# Patient Record
Sex: MALE | Race: WHITE | NOT HISPANIC OR LATINO | URBAN - METROPOLITAN AREA
[De-identification: names, ages, dates, MRNs, and addresses within clinical notes are randomized per-mention and may not be internally consistent; named-entity substitution may affect disease eponyms.]

---
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
## 2024-10-22 ENCOUNTER — APPOINTMENT (OUTPATIENT)
Dept: OTOLARYNGOLOGY | Facility: CLINIC | Age: 19
End: 2024-10-22
Payer: OTHER GOVERNMENT

## 2024-10-22 VITALS
SYSTOLIC BLOOD PRESSURE: 118 MMHG | WEIGHT: 210 LBS | HEIGHT: 73 IN | HEART RATE: 90 BPM | DIASTOLIC BLOOD PRESSURE: 80 MMHG | BODY MASS INDEX: 27.83 KG/M2

## 2024-10-22 DIAGNOSIS — E04.1 NONTOXIC SINGLE THYROID NODULE: ICD-10-CM

## 2024-10-22 PROCEDURE — 99204 OFFICE O/P NEW MOD 45 MIN: CPT

## 2024-11-07 ENCOUNTER — NON-APPOINTMENT (OUTPATIENT)
Age: 19
End: 2024-11-07

## 2024-11-07 ENCOUNTER — LABORATORY RESULT (OUTPATIENT)
Age: 19
End: 2024-11-07

## 2024-11-07 ENCOUNTER — APPOINTMENT (OUTPATIENT)
Dept: OTOLARYNGOLOGY | Facility: CLINIC | Age: 19
End: 2024-11-07
Payer: OTHER GOVERNMENT

## 2024-11-07 VITALS
OXYGEN SATURATION: 98 % | SYSTOLIC BLOOD PRESSURE: 112 MMHG | WEIGHT: 240 LBS | DIASTOLIC BLOOD PRESSURE: 74 MMHG | BODY MASS INDEX: 30.8 KG/M2 | HEART RATE: 107 BPM | HEIGHT: 74 IN

## 2024-11-07 DIAGNOSIS — E04.1 NONTOXIC SINGLE THYROID NODULE: ICD-10-CM

## 2024-11-07 PROCEDURE — 10005 FNA BX W/US GDN 1ST LES: CPT

## 2024-11-07 PROCEDURE — 99212 OFFICE O/P EST SF 10 MIN: CPT | Mod: 25

## 2024-11-08 ENCOUNTER — TRANSCRIPTION ENCOUNTER (OUTPATIENT)
Age: 19
End: 2024-11-08

## 2024-12-10 ENCOUNTER — APPOINTMENT (OUTPATIENT)
Dept: OTOLARYNGOLOGY | Facility: CLINIC | Age: 19
End: 2024-12-10
Payer: OTHER GOVERNMENT

## 2024-12-10 VITALS
HEIGHT: 74 IN | BODY MASS INDEX: 30.8 KG/M2 | HEART RATE: 74 BPM | SYSTOLIC BLOOD PRESSURE: 122 MMHG | DIASTOLIC BLOOD PRESSURE: 72 MMHG | WEIGHT: 240 LBS

## 2024-12-10 DIAGNOSIS — E04.1 NONTOXIC SINGLE THYROID NODULE: ICD-10-CM

## 2024-12-10 PROCEDURE — 31575 DIAGNOSTIC LARYNGOSCOPY: CPT

## 2024-12-10 PROCEDURE — 99214 OFFICE O/P EST MOD 30 MIN: CPT | Mod: 25

## 2025-01-15 ENCOUNTER — OUTPATIENT (OUTPATIENT)
Dept: OUTPATIENT SERVICES | Facility: HOSPITAL | Age: 20
LOS: 1 days | End: 2025-01-15

## 2025-01-15 VITALS
SYSTOLIC BLOOD PRESSURE: 116 MMHG | WEIGHT: 250 LBS | TEMPERATURE: 97 F | OXYGEN SATURATION: 98 % | RESPIRATION RATE: 18 BRPM | HEIGHT: 74 IN | HEART RATE: 81 BPM | DIASTOLIC BLOOD PRESSURE: 78 MMHG

## 2025-01-15 DIAGNOSIS — E04.1 NONTOXIC SINGLE THYROID NODULE: ICD-10-CM

## 2025-01-15 DIAGNOSIS — Z98.890 OTHER SPECIFIED POSTPROCEDURAL STATES: Chronic | ICD-10-CM

## 2025-01-15 DIAGNOSIS — K08.409 PARTIAL LOSS OF TEETH, UNSPECIFIED CAUSE, UNSPECIFIED CLASS: Chronic | ICD-10-CM

## 2025-01-15 RX ORDER — BUPROPION HYDROCHLORIDE 150 MG/1
0 TABLET, EXTENDED RELEASE ORAL
Refills: 0 | DISCHARGE

## 2025-01-15 RX ORDER — SODIUM CHLORIDE 9 G/ML
1000 INJECTION, SOLUTION INTRAVENOUS
Refills: 0 | Status: DISCONTINUED | OUTPATIENT
Start: 2025-01-22 | End: 2025-01-23

## 2025-01-15 NOTE — H&P PST ADULT - HISTORY OF PRESENT ILLNESS
19 yr old male with ADHD present for preop evaluation with dx of Nontoxic Single Thyroid Nodule noted incidentally on ct scan.  Patient c/o for chest pains in September 2024 and workup for chest pains (negative [facility]) nodule was noted.  Patient was then sent for sonogram and then biopsy. US thyroid IMPRESSION: 2.6cm x 2.0 x 2.2cm Left lower pole nodule (TR3).  11/7/2024 s/p FNA of L thyroid showed AUS (Category III). Patient is now scheduled for Total Thyroidectomy on 01/22/25. 19 yr old male with ADHD present for preop evaluation with dx of Nontoxic Single Thyroid Nodule noted incidentally on ct scan.  Patient c/o for chest pains in September 2024 and workup for chest pains (negative [facility]) nodule was noted.  Patient was then sent for sonogram and then biopsy. US thyroid IMPRESSION: 2.6cm x 2.0 x 2.2cm Left lower pole nodule (TR3).  11/7/2024 s/p FNA of L thyroid showed AUS (Category III). Patient is now scheduled for Left Thyroid Lobectomy on 01/22/25.

## 2025-01-15 NOTE — H&P PST ADULT - NSICDXPASTSURGICALHX_GEN_ALL_CORE_FT
PAST SURGICAL HISTORY:  H/O wisdom tooth extraction      PAST SURGICAL HISTORY:  H/O wisdom tooth extraction     History of endoscopy

## 2025-01-15 NOTE — H&P PST ADULT - NSICDXPASTMEDICALHX_GEN_ALL_CORE_FT
PAST MEDICAL HISTORY:  ADHD     Nontoxic single thyroid nodule      PAST MEDICAL HISTORY:  ADHD     GERD (gastroesophageal reflux disease)     Nontoxic single thyroid nodule

## 2025-01-15 NOTE — H&P PST ADULT - PROBLEM SELECTOR PLAN 1
Scheduled for Left Thyroid Lobectomy on 01/22/25.  Preop instructions provided and patient verbalizes understanding.   Famotidine provided with instructions. Hibiclens provided with instructions and was signed by patient. Teach-back method was utilized to assess patient's understanding. Patient verbalized understanding.

## 2025-01-21 NOTE — ASU PATIENT PROFILE, ADULT - NSICDXPASTMEDICALHX_GEN_ALL_CORE_FT
PAST MEDICAL HISTORY:  ADHD     GERD (gastroesophageal reflux disease)     Nontoxic single thyroid nodule

## 2025-01-22 ENCOUNTER — TRANSCRIPTION ENCOUNTER (OUTPATIENT)
Age: 20
End: 2025-01-22

## 2025-01-22 ENCOUNTER — INPATIENT (INPATIENT)
Facility: HOSPITAL | Age: 20
LOS: 0 days | Discharge: HOME | End: 2025-01-23
Attending: OTOLARYNGOLOGY | Admitting: OTOLARYNGOLOGY
Payer: OTHER GOVERNMENT

## 2025-01-22 ENCOUNTER — APPOINTMENT (OUTPATIENT)
Dept: OTOLARYNGOLOGY | Facility: HOSPITAL | Age: 20
End: 2025-01-22

## 2025-01-22 VITALS
HEIGHT: 74 IN | TEMPERATURE: 98 F | DIASTOLIC BLOOD PRESSURE: 75 MMHG | HEART RATE: 71 BPM | SYSTOLIC BLOOD PRESSURE: 141 MMHG | WEIGHT: 250 LBS | RESPIRATION RATE: 16 BRPM | OXYGEN SATURATION: 99 %

## 2025-01-22 DIAGNOSIS — E04.1 NONTOXIC SINGLE THYROID NODULE: ICD-10-CM

## 2025-01-22 DIAGNOSIS — K08.409 PARTIAL LOSS OF TEETH, UNSPECIFIED CAUSE, UNSPECIFIED CLASS: Chronic | ICD-10-CM

## 2025-01-22 DIAGNOSIS — Z98.890 OTHER SPECIFIED POSTPROCEDURAL STATES: Chronic | ICD-10-CM

## 2025-01-22 LAB
CALCIUM SERPL-MCNC: 9.4 MG/DL — SIGNIFICANT CHANGE UP (ref 8.4–10.5)
PTH-INTACT FLD-MCNC: 23 PG/ML — SIGNIFICANT CHANGE UP (ref 15–65)

## 2025-01-22 PROCEDURE — 88307 TISSUE EXAM BY PATHOLOGIST: CPT | Mod: 26

## 2025-01-22 PROCEDURE — 60240 REMOVAL OF THYROID: CPT

## 2025-01-22 DEVICE — TUBE EMG NIM TRIVANTAGE 7MM: Type: IMPLANTABLE DEVICE | Site: LEFT | Status: FUNCTIONAL

## 2025-01-22 DEVICE — LIGATING CLIPS WECK HORIZON MEDIUM (BLUE) 24: Type: IMPLANTABLE DEVICE | Site: LEFT | Status: FUNCTIONAL

## 2025-01-22 DEVICE — SURGICEL 2 X 14": Type: IMPLANTABLE DEVICE | Site: LEFT | Status: FUNCTIONAL

## 2025-01-22 DEVICE — CLIP LIG TI SM/WIDE 24/BX: Type: IMPLANTABLE DEVICE | Site: LEFT | Status: FUNCTIONAL

## 2025-01-22 RX ORDER — BUPROPION HYDROCHLORIDE 150 MG/1
0 TABLET, EXTENDED RELEASE ORAL
Refills: 0 | DISCHARGE

## 2025-01-22 RX ORDER — HYDROMORPHONE HYDROCHLORIDE 4 MG/ML
0.5 INJECTION, SOLUTION INTRAMUSCULAR; INTRAVENOUS; SUBCUTANEOUS
Refills: 0 | Status: DISCONTINUED | OUTPATIENT
Start: 2025-01-22 | End: 2025-01-22

## 2025-01-22 RX ORDER — BUPROPION HYDROCHLORIDE 150 MG/1
150 TABLET, EXTENDED RELEASE ORAL DAILY
Refills: 0 | Status: DISCONTINUED | OUTPATIENT
Start: 2025-01-23 | End: 2025-01-23

## 2025-01-22 RX ORDER — OXYCODONE HYDROCHLORIDE 30 MG/1
1 TABLET ORAL
Qty: 8 | Refills: 0
Start: 2025-01-22 | End: 2025-01-23

## 2025-01-22 RX ORDER — OXYCODONE HYDROCHLORIDE 30 MG/1
10 TABLET ORAL EVERY 6 HOURS
Refills: 0 | Status: DISCONTINUED | OUTPATIENT
Start: 2025-01-22 | End: 2025-01-23

## 2025-01-22 RX ORDER — ACETAMINOPHEN 160 MG/5ML
650 SUSPENSION ORAL EVERY 6 HOURS
Refills: 0 | Status: DISCONTINUED | OUTPATIENT
Start: 2025-01-22 | End: 2025-01-23

## 2025-01-22 RX ORDER — LEVOTHYROXINE SODIUM 25 UG/1
200 TABLET ORAL DAILY
Refills: 0 | Status: DISCONTINUED | OUTPATIENT
Start: 2025-01-23 | End: 2025-01-23

## 2025-01-22 RX ORDER — ACETAMINOPHEN, DIPHENHYDRAMINE HCL, PHENYLEPHRINE HCL 325; 25; 5 MG/1; MG/1; MG/1
3 TABLET ORAL AT BEDTIME
Refills: 0 | Status: DISCONTINUED | OUTPATIENT
Start: 2025-01-22 | End: 2025-01-23

## 2025-01-22 RX ORDER — OXYCODONE HYDROCHLORIDE 30 MG/1
5 TABLET ORAL ONCE
Refills: 0 | Status: DISCONTINUED | OUTPATIENT
Start: 2025-01-22 | End: 2025-01-22

## 2025-01-22 RX ORDER — OXYCODONE HYDROCHLORIDE 30 MG/1
5 TABLET ORAL EVERY 6 HOURS
Refills: 0 | Status: DISCONTINUED | OUTPATIENT
Start: 2025-01-22 | End: 2025-01-23

## 2025-01-22 RX ORDER — DIPHENHYDRAMINE HCL 25 MG
1 TABLET ORAL
Refills: 0 | DISCHARGE

## 2025-01-22 RX ORDER — ONDANSETRON 4 MG/1
4 TABLET, ORALLY DISINTEGRATING ORAL ONCE
Refills: 0 | Status: DISCONTINUED | OUTPATIENT
Start: 2025-01-22 | End: 2025-01-22

## 2025-01-22 RX ORDER — PHENOL 1.4 %
1 AEROSOL, SPRAY (ML) MUCOUS MEMBRANE
Refills: 0 | Status: DISCONTINUED | OUTPATIENT
Start: 2025-01-22 | End: 2025-01-23

## 2025-01-22 RX ADMIN — HYDROMORPHONE HYDROCHLORIDE 0.5 MILLIGRAM(S): 4 INJECTION, SOLUTION INTRAMUSCULAR; INTRAVENOUS; SUBCUTANEOUS at 19:50

## 2025-01-22 RX ADMIN — HYDROMORPHONE HYDROCHLORIDE 0.5 MILLIGRAM(S): 4 INJECTION, SOLUTION INTRAMUSCULAR; INTRAVENOUS; SUBCUTANEOUS at 20:22

## 2025-01-22 RX ADMIN — Medication 1 LOZENGE: at 19:35

## 2025-01-22 RX ADMIN — HYDROMORPHONE HYDROCHLORIDE 0.5 MILLIGRAM(S): 4 INJECTION, SOLUTION INTRAMUSCULAR; INTRAVENOUS; SUBCUTANEOUS at 19:34

## 2025-01-22 RX ADMIN — ACETAMINOPHEN 650 MILLIGRAM(S): 160 SUSPENSION ORAL at 23:41

## 2025-01-22 RX ADMIN — ACETAMINOPHEN, DIPHENHYDRAMINE HCL, PHENYLEPHRINE HCL 3 MILLIGRAM(S): 325; 25; 5 TABLET ORAL at 23:11

## 2025-01-22 RX ADMIN — ACETAMINOPHEN 650 MILLIGRAM(S): 160 SUSPENSION ORAL at 23:11

## 2025-01-22 RX ADMIN — OXYCODONE HYDROCHLORIDE 5 MILLIGRAM(S): 30 TABLET ORAL at 19:55

## 2025-01-22 RX ADMIN — HYDROMORPHONE HYDROCHLORIDE 0.5 MILLIGRAM(S): 4 INJECTION, SOLUTION INTRAMUSCULAR; INTRAVENOUS; SUBCUTANEOUS at 19:00

## 2025-01-23 ENCOUNTER — TRANSCRIPTION ENCOUNTER (OUTPATIENT)
Age: 20
End: 2025-01-23

## 2025-01-23 VITALS
HEART RATE: 68 BPM | SYSTOLIC BLOOD PRESSURE: 105 MMHG | OXYGEN SATURATION: 98 % | TEMPERATURE: 98 F | RESPIRATION RATE: 18 BRPM | DIASTOLIC BLOOD PRESSURE: 52 MMHG

## 2025-01-23 LAB
ANION GAP SERPL CALC-SCNC: 14 MMOL/L — SIGNIFICANT CHANGE UP (ref 7–14)
BUN SERPL-MCNC: 17 MG/DL — SIGNIFICANT CHANGE UP (ref 7–23)
CALCIUM SERPL-MCNC: 8.2 MG/DL — LOW (ref 8.4–10.5)
CALCIUM SERPL-MCNC: 8.3 MG/DL — LOW (ref 8.4–10.5)
CALCIUM SERPL-MCNC: 8.8 MG/DL — SIGNIFICANT CHANGE UP (ref 8.4–10.5)
CHLORIDE SERPL-SCNC: 101 MMOL/L — SIGNIFICANT CHANGE UP (ref 98–107)
CO2 SERPL-SCNC: 23 MMOL/L — SIGNIFICANT CHANGE UP (ref 22–31)
CREAT SERPL-MCNC: 1.04 MG/DL — SIGNIFICANT CHANGE UP (ref 0.5–1.3)
EGFR: 104 ML/MIN/1.73M2 — SIGNIFICANT CHANGE UP
GLUCOSE SERPL-MCNC: 110 MG/DL — HIGH (ref 70–99)
HCT VFR BLD CALC: 37.1 % — LOW (ref 39–50)
HGB BLD-MCNC: 12.8 G/DL — LOW (ref 13–17)
MAGNESIUM SERPL-MCNC: 1.9 MG/DL — SIGNIFICANT CHANGE UP (ref 1.6–2.6)
MCHC RBC-ENTMCNC: 29.5 PG — SIGNIFICANT CHANGE UP (ref 27–34)
MCHC RBC-ENTMCNC: 34.5 G/DL — SIGNIFICANT CHANGE UP (ref 32–36)
MCV RBC AUTO: 85.5 FL — SIGNIFICANT CHANGE UP (ref 80–100)
NRBC # BLD AUTO: 0 K/UL — SIGNIFICANT CHANGE UP (ref 0–0)
NRBC # BLD: 0 /100 WBCS — SIGNIFICANT CHANGE UP (ref 0–0)
NRBC # FLD: 0 K/UL — SIGNIFICANT CHANGE UP (ref 0–0)
NRBC BLD-RTO: 0 /100 WBCS — SIGNIFICANT CHANGE UP (ref 0–0)
PHOSPHATE SERPL-MCNC: 4.6 MG/DL — HIGH (ref 2.5–4.5)
PLATELET # BLD AUTO: 205 K/UL — SIGNIFICANT CHANGE UP (ref 150–400)
POTASSIUM SERPL-MCNC: 3.7 MMOL/L — SIGNIFICANT CHANGE UP (ref 3.5–5.3)
POTASSIUM SERPL-SCNC: 3.7 MMOL/L — SIGNIFICANT CHANGE UP (ref 3.5–5.3)
RBC # BLD: 4.34 M/UL — SIGNIFICANT CHANGE UP (ref 4.2–5.8)
RBC # FLD: 12.9 % — SIGNIFICANT CHANGE UP (ref 10.3–14.5)
SODIUM SERPL-SCNC: 138 MMOL/L — SIGNIFICANT CHANGE UP (ref 135–145)
WBC # BLD: 7.83 K/UL — SIGNIFICANT CHANGE UP (ref 3.8–10.5)
WBC # FLD AUTO: 7.83 K/UL — SIGNIFICANT CHANGE UP (ref 3.8–10.5)

## 2025-01-23 RX ORDER — HYDROMORPHONE HYDROCHLORIDE 4 MG/ML
0.2 INJECTION, SOLUTION INTRAMUSCULAR; INTRAVENOUS; SUBCUTANEOUS ONCE
Refills: 0 | Status: DISCONTINUED | OUTPATIENT
Start: 2025-01-23 | End: 2025-01-23

## 2025-01-23 RX ORDER — OXYCODONE HYDROCHLORIDE 30 MG/1
1 TABLET ORAL
Qty: 20 | Refills: 0
Start: 2025-01-23 | End: 2025-01-27

## 2025-01-23 RX ORDER — IBUPROFEN 200 MG
3 CAPSULE ORAL
Qty: 3 | Refills: 0
Start: 2025-01-23 | End: 2025-01-23

## 2025-01-23 RX ORDER — LEVOTHYROXINE SODIUM 25 UG/1
1 TABLET ORAL
Qty: 30 | Refills: 0
Start: 2025-01-23 | End: 2025-02-21

## 2025-01-23 RX ORDER — BUPROPION HYDROCHLORIDE 150 MG/1
1 TABLET, EXTENDED RELEASE ORAL
Qty: 0 | Refills: 0 | DISCHARGE
Start: 2025-01-23

## 2025-01-23 RX ADMIN — OXYCODONE HYDROCHLORIDE 10 MILLIGRAM(S): 30 TABLET ORAL at 04:55

## 2025-01-23 RX ADMIN — HYDROMORPHONE HYDROCHLORIDE 0.2 MILLIGRAM(S): 4 INJECTION, SOLUTION INTRAMUSCULAR; INTRAVENOUS; SUBCUTANEOUS at 01:24

## 2025-01-23 RX ADMIN — HYDROMORPHONE HYDROCHLORIDE 0.2 MILLIGRAM(S): 4 INJECTION, SOLUTION INTRAMUSCULAR; INTRAVENOUS; SUBCUTANEOUS at 00:54

## 2025-01-23 RX ADMIN — BUPROPION HYDROCHLORIDE 150 MILLIGRAM(S): 150 TABLET, EXTENDED RELEASE ORAL at 11:37

## 2025-01-23 RX ADMIN — ACETAMINOPHEN 650 MILLIGRAM(S): 160 SUSPENSION ORAL at 14:41

## 2025-01-23 RX ADMIN — OXYCODONE HYDROCHLORIDE 10 MILLIGRAM(S): 30 TABLET ORAL at 10:25

## 2025-01-23 RX ADMIN — ACETAMINOPHEN 650 MILLIGRAM(S): 160 SUSPENSION ORAL at 08:24

## 2025-01-23 RX ADMIN — ACETAMINOPHEN 650 MILLIGRAM(S): 160 SUSPENSION ORAL at 08:54

## 2025-01-23 RX ADMIN — LEVOTHYROXINE SODIUM 200 MICROGRAM(S): 25 TABLET ORAL at 05:52

## 2025-01-23 RX ADMIN — OXYCODONE HYDROCHLORIDE 10 MILLIGRAM(S): 30 TABLET ORAL at 04:25

## 2025-01-23 RX ADMIN — OXYCODONE HYDROCHLORIDE 10 MILLIGRAM(S): 30 TABLET ORAL at 10:55

## 2025-01-23 RX ADMIN — ACETAMINOPHEN 650 MILLIGRAM(S): 160 SUSPENSION ORAL at 15:11

## 2025-01-23 NOTE — DISCHARGE NOTE NURSING/CASE MANAGEMENT/SOCIAL WORK - PATIENT PORTAL LINK FT
You can access the FollowMyHealth Patient Portal offered by [facility] by registering at the following website: [email or web address]. By joining [facility]’s FollowMyHealth portal, you will also be able to view your health information using other applications (apps) compatible with our system.

## 2025-01-23 NOTE — PATIENT PROFILE ADULT - FALL HARM RISK - RISK INTERVENTIONS
Assistance OOB with selected safe patient handling equipment/Assistance with ambulation/Communicate Fall Risk and Risk Factors to all staff, patient, and family/Monitor gait and stability/Reinforce activity limits and safety measures with patient and family/Sit up slowly, dangle for a short time, stand at bedside before walking/Use of alarms - bed, chair and/or voice tab/Visual Cue: Yellow wristband/Bed in lowest position, wheels locked, appropriate side rails in place/Call bell, personal items and telephone in reach/Instruct patient to call for assistance before getting out of bed or chair/Non-slip footwear when patient is out of bed/[place] to call system/Physically safe environment - no spills, clutter or unnecessary equipment/Purposeful Proactive Rounding/Room/bathroom lighting operational, light cord in reach

## 2025-01-23 NOTE — DISCHARGE NOTE PROVIDER - NSDCMRMEDTOKEN_GEN_ALL_CORE_FT
buPROPion 150 mg/24 hours (XL) oral tablet, extended release: 1 tab(s) orally once a day  BUPROPION HCL  MG TABLET: 1 TABLET IN THE MORNING ORALLY ONCE A DAY  levothyroxine 200 mcg (0.2 mg) oral tablet: 1 tab(s) orally once a day  oxyCODONE 10 mg oral tablet: 1 tab(s) orally 4 times a day as needed for Severe Pain (7 - 10) MDD: 4   buPROPion 150 mg/24 hours (XL) oral tablet, extended release: 1 tab(s) orally once a day  BUPROPION HCL  MG TABLET: 1 TABLET IN THE MORNING ORALLY ONCE A DAY  calcium carbonate 1250 mg (500 mg elemental calcium) oral tablet: 3 tab(s) orally once  levothyroxine 200 mcg (0.2 mg) oral tablet: 1 tab(s) orally once a day  oxyCODONE 10 mg oral tablet: 1 tab(s) orally 4 times a day as needed for Severe Pain (7 - 10) MDD: 4

## 2025-01-23 NOTE — PROGRESS NOTE ADULT - SUBJECTIVE AND OBJECTIVE BOX
INTERVAL HX:  AVSS. Doing well. Ca 9.4-->8.8     Vital Signs Last 24 Hrs  T(C): 36.3 (23 Jan 2025 05:46), Max: 37.4 (22 Jan 2025 18:55)  T(F): 97.3 (23 Jan 2025 05:46), Max: 99.3 (22 Jan 2025 18:55)  HR: 75 (23 Jan 2025 05:46) (71 - 103)  BP: 132/70 (23 Jan 2025 05:46) (112/80 - 141/75)  BP(mean): 95 (22 Jan 2025 21:00) (83 - 97)  RR: 18 (23 Jan 2025 05:46) (10 - 19)  SpO2: 99% (23 Jan 2025 05:46) (96% - 100%)    Parameters below as of 23 Jan 2025 05:46  Patient On (Oxygen Delivery Method): room air          NAD, lying in bed  Breathing comfortably on room air, no stridor, stertor  OC/OP: no erythema, bleeding, lacerations; dentition same as prior to surgery  Neck flat and supple; no induration or collection  Incision c/d/i    A/P:   20y Male s/p total thyroidectomy 1/22, trending calciums.    - fu AM calcium   - home meds restarted   - possible dc if calciums stable

## 2025-01-23 NOTE — DISCHARGE NOTE NURSING/CASE MANAGEMENT/SOCIAL WORK - FINANCIAL ASSISTANCE
[facility] provides services at a reduced cost to those who are determined to be eligible through [facility]’s financial assistance program. Information regarding [facility]’s financial assistance program can be found by going to [email or web address] or by calling [phone].

## 2025-01-23 NOTE — DISCHARGE NOTE PROVIDER - NSDCFUSCHEDAPPT_GEN_ALL_CORE_FT
Iain Crawford  [facility]  [address]  Scheduled Appointment: 02/11/2025

## 2025-01-23 NOTE — PROGRESS NOTE ADULT - SUBJECTIVE AND OBJECTIVE BOX
ANESTHESIA POSTOP CHECK    20y Male POSTOP DAY 1     Vital Signs Last 24 Hrs  T(C): 36.3 (23 Jan 2025 05:46), Max: 37.4 (22 Jan 2025 18:55)  T(F): 97.3 (23 Jan 2025 05:46), Max: 99.3 (22 Jan 2025 18:55)  HR: 75 (23 Jan 2025 05:46) (71 - 103)  BP: 132/70 (23 Jan 2025 05:46) (112/80 - 141/75)  BP(mean): 95 (22 Jan 2025 21:00) (83 - 97)  RR: 18 (23 Jan 2025 05:46) (10 - 19)  SpO2: 99% (23 Jan 2025 05:46) (96% - 100%)    Parameters below as of 23 Jan 2025 05:46  Patient On (Oxygen Delivery Method): room air      I&O's Summary    22 Jan 2025 07:01  -  23 Jan 2025 07:00  --------------------------------------------------------  IN: 350 mL / OUT: 0 mL / NET: 350 mL        [X ] NO APPARENT ANESTHESIA COMPLICATIONS      Comments:

## 2025-01-23 NOTE — DISCHARGE NOTE PROVIDER - HOSPITAL COURSE
S/P total thyroidectomy, Calcium levels were trended. Patient was started on an oral diet and cleared for discharge home on 2/23/25.

## 2025-01-23 NOTE — DISCHARGE NOTE PROVIDER - NSDCFUADDINST_GEN_ALL_CORE_FT
If you have any Numbness ,tingling or muscle cramping to the lips, hands, fingers, or toes then take three calcium carbonate tablets that were prescribed and go to the [facility] emergency dept.

## 2025-01-23 NOTE — DISCHARGE NOTE PROVIDER - CARE PROVIDER_API CALL
Iain Crawford [facility]  Otolaryngology  [address] [place] [id number]  Phone: [phone]  Fax: [phone]  Follow Up Time:

## 2025-01-25 RX ORDER — ONDANSETRON 4 MG/1
1 TABLET, ORALLY DISINTEGRATING ORAL
Qty: 28 | Refills: 0
Start: 2025-01-25 | End: 2025-01-31

## 2025-02-06 LAB — SURGICAL PATHOLOGY STUDY: SIGNIFICANT CHANGE UP

## 2025-02-11 ENCOUNTER — APPOINTMENT (OUTPATIENT)
Dept: OTOLARYNGOLOGY | Facility: CLINIC | Age: 20
End: 2025-02-11
Payer: OTHER GOVERNMENT

## 2025-02-11 VITALS
BODY MASS INDEX: 32.08 KG/M2 | HEART RATE: 80 BPM | SYSTOLIC BLOOD PRESSURE: 121 MMHG | WEIGHT: 250 LBS | DIASTOLIC BLOOD PRESSURE: 75 MMHG | HEIGHT: 74 IN

## 2025-02-11 DIAGNOSIS — E04.1 NONTOXIC SINGLE THYROID NODULE: ICD-10-CM

## 2025-02-11 PROBLEM — K21.9 GASTRO-ESOPHAGEAL REFLUX DISEASE WITHOUT ESOPHAGITIS: Chronic | Status: ACTIVE | Noted: 2025-01-15

## 2025-02-11 PROCEDURE — 99024 POSTOP FOLLOW-UP VISIT: CPT

## 2025-02-19 RX ORDER — LEVOTHYROXINE SODIUM 0.2 MG/1
200 TABLET ORAL DAILY
Qty: 30 | Refills: 0 | Status: ACTIVE | COMMUNITY
Start: 2025-02-19 | End: 1900-01-01

## 2025-03-19 ENCOUNTER — RX RENEWAL (OUTPATIENT)
Age: 20
End: 2025-03-19

## 2025-05-01 ENCOUNTER — NON-APPOINTMENT (OUTPATIENT)
Age: 20
End: 2025-05-01

## 2025-05-02 ENCOUNTER — NON-APPOINTMENT (OUTPATIENT)
Age: 20
End: 2025-05-02

## 2025-05-06 ENCOUNTER — APPOINTMENT (OUTPATIENT)
Dept: OTOLARYNGOLOGY | Facility: CLINIC | Age: 20
End: 2025-05-06
Payer: OTHER GOVERNMENT

## 2025-05-06 VITALS
SYSTOLIC BLOOD PRESSURE: 136 MMHG | HEIGHT: 74 IN | DIASTOLIC BLOOD PRESSURE: 85 MMHG | HEART RATE: 118 BPM | WEIGHT: 265 LBS | BODY MASS INDEX: 34.01 KG/M2

## 2025-05-06 DIAGNOSIS — E04.1 NONTOXIC SINGLE THYROID NODULE: ICD-10-CM

## 2025-05-06 PROCEDURE — 99212 OFFICE O/P EST SF 10 MIN: CPT

## 2025-05-06 RX ORDER — LEVOTHYROXINE SODIUM 175 UG/1
175 CAPSULE ORAL
Refills: 0 | Status: ACTIVE | COMMUNITY

## 2025-05-08 ENCOUNTER — NON-APPOINTMENT (OUTPATIENT)
Age: 20
End: 2025-05-08

## 2025-09-11 ENCOUNTER — NON-APPOINTMENT (OUTPATIENT)
Age: 20
End: 2025-09-11

## 2025-09-11 RX ORDER — LEVOTHYROXINE SODIUM 0.17 MG/1
175 TABLET ORAL
Qty: 90 | Refills: 0 | Status: ACTIVE | COMMUNITY
Start: 2025-09-11 | End: 1900-01-01

## 2025-09-12 ENCOUNTER — RX RENEWAL (OUTPATIENT)
Age: 20
End: 2025-09-12

## (undated) DEVICE — PACK HEAD & NECK

## (undated) DEVICE — DRAIN BLAKE 10FR ROUND

## (undated) DEVICE — SUT SILK 2-0 18" FS

## (undated) DEVICE — SUT MONOCRYL 4-0 27" PS-2 UNDYED

## (undated) DEVICE — DRAPE SPLIT SHEET 77" X 120"

## (undated) DEVICE — DRSG MASTISOL

## (undated) DEVICE — CANISTER DISPOSABLE THIN WALL 3000CC

## (undated) DEVICE — VENODYNE/SCD SLEEVE CALF MEDIUM

## (undated) DEVICE — SOL IRR POUR H2O 500ML

## (undated) DEVICE — BIPOLAR FORCEP KIRWAN JEWELERS STR 4" X 0.4MM W 12FT CORD (GREEN)

## (undated) DEVICE — LIGASURE SMALL JAW

## (undated) DEVICE — DRSG CURITY GAUZE SPONGE 4 X 4" 12-PLY

## (undated) DEVICE — WARMING BLANKET LOWER ADULT

## (undated) DEVICE — ELCTR BOVIE PENCIL SMOKE EVACUATION

## (undated) DEVICE — STAPLER SKIN VISI-STAT 35 WIDE

## (undated) DEVICE — ELCTR GROUNDING PAD ADULT COVIDIEN

## (undated) DEVICE — LABELS BLANK W PEN

## (undated) DEVICE — DRAPE MAGNETIC INSTRUMENT MEDIUM

## (undated) DEVICE — SUT SILK 2-0 24" TIES

## (undated) DEVICE — DRAIN RESERVOIR FOR JACKSON PRATT 100CC CARDINAL

## (undated) DEVICE — DRSG STERISTRIPS 0.5 X 4"

## (undated) DEVICE — DRAPE TOWEL BLUE 17" X 24"

## (undated) DEVICE — DRAPE 3/4 SHEET 52X76"

## (undated) DEVICE — LONE STAR RETRACTOR RING 12MM BLUNT DISP

## (undated) DEVICE — SUT SILK 2-0 30" SH

## (undated) DEVICE — GLV 7.5 PROTEXIS (WHITE)